# Patient Record
Sex: MALE | Race: WHITE | NOT HISPANIC OR LATINO | URBAN - METROPOLITAN AREA
[De-identification: names, ages, dates, MRNs, and addresses within clinical notes are randomized per-mention and may not be internally consistent; named-entity substitution may affect disease eponyms.]

---

## 2018-12-26 ENCOUNTER — EMERGENCY (EMERGENCY)
Facility: HOSPITAL | Age: 23
LOS: 0 days | Discharge: HOME | End: 2018-12-26
Admitting: PHYSICIAN ASSISTANT

## 2018-12-26 VITALS
HEART RATE: 68 BPM | DIASTOLIC BLOOD PRESSURE: 69 MMHG | OXYGEN SATURATION: 100 % | RESPIRATION RATE: 18 BRPM | TEMPERATURE: 98 F | SYSTOLIC BLOOD PRESSURE: 128 MMHG

## 2018-12-26 DIAGNOSIS — F17.200 NICOTINE DEPENDENCE, UNSPECIFIED, UNCOMPLICATED: ICD-10-CM

## 2018-12-26 DIAGNOSIS — K12.1 OTHER FORMS OF STOMATITIS: ICD-10-CM

## 2018-12-26 NOTE — ED ADULT TRIAGE NOTE - CHIEF COMPLAINT QUOTE
"I have sores in my mouth. I got antibiotics (Amoxicillin) and mouthwash but I just want to know the prognosis. I'm going away in a couple days."

## 2018-12-26 NOTE — ED ADULT NURSE NOTE - OBJECTIVE STATEMENT
Pt present to ED with complains of mouth sore, is currently on antibiotic, wants to know the prognosis of mouth sore. Denies fever, chills, nausea, vomiting, and diarrhea.

## 2018-12-26 NOTE — ED ADULT NURSE NOTE - NSIMPLEMENTINTERV_GEN_ALL_ED
Implemented All Universal Safety Interventions:  [place] to call system. Call bell, personal items and telephone within reach. Instruct patient to call for assistance. Room bathroom lighting operational. Non-slip footwear when patient is off stretcher. Physically safe environment: no spills, clutter or unnecessary equipment. Stretcher in lowest position, wheels locked, appropriate side rails in place.

## 2018-12-26 NOTE — ED PROVIDER NOTE - NS ED ROS FT
Constitutional: (-) fever  ENT: (+)sores to mouth  Skin: (-) rash  Neurological: (-) altered mental status

## 2018-12-26 NOTE — ED PROVIDER NOTE - PHYSICAL EXAMINATION
Physical Exam    Vital Signs: I have reviewed the initial vital signs.  Constitutional: well-nourished, appears stated age, no acute distress  Skin: warm, dry  ENT: +1cm whitish areas to b/l buccal mucosa. Non tender. No swelling or redness. Pharynx: No redness or exudates. No trismus or drooling  Neuro: AOx3, Motor 5/5, Sensation: equal and intact

## 2018-12-26 NOTE — ED PROVIDER NOTE - OBJECTIVE STATEMENT
22 yo M  c/o sore to mouth. Patient states he has had sores and white spots to inner cheeks and throat  x 4 days. He was seen in [facility] [place] 3 days ago and was started on Amox x 10 days. No fevers, pain, or sore throat.  ? trauma to cheeks from biting with teeth. 22 yo M  c/o sores to mouth. Patient states he has had sores and white spots to inner cheeks and throat  x 4 days. He was seen in [facility] [place] 3 days ago and was started on Amox x 10 days. No fevers, pain, or sore throat.  ? trauma to cheeks from biting with teeth.